# Patient Record
Sex: MALE | Race: OTHER | NOT HISPANIC OR LATINO | URBAN - METROPOLITAN AREA
[De-identification: names, ages, dates, MRNs, and addresses within clinical notes are randomized per-mention and may not be internally consistent; named-entity substitution may affect disease eponyms.]

---
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
## 2019-01-06 NOTE — ED ADULT TRIAGE NOTE - CCCP TRG CHIEF CMPLNT
shoulder & back pain
decreased safety awareness/losing balance/decreased sequencing ability/decreased weight-shifting ability